# Patient Record
Sex: FEMALE | Race: WHITE | HISPANIC OR LATINO | Employment: FULL TIME | URBAN - METROPOLITAN AREA
[De-identification: names, ages, dates, MRNs, and addresses within clinical notes are randomized per-mention and may not be internally consistent; named-entity substitution may affect disease eponyms.]

---

## 2018-09-28 ENCOUNTER — HOSPITAL ENCOUNTER (OUTPATIENT)
Dept: LAB | Facility: MEDICAL CENTER | Age: 58
End: 2018-09-28
Attending: FAMILY MEDICINE
Payer: COMMERCIAL

## 2018-09-28 ENCOUNTER — OFFICE VISIT (OUTPATIENT)
Dept: MEDICAL GROUP | Facility: PHYSICIAN GROUP | Age: 58
End: 2018-09-28
Payer: COMMERCIAL

## 2018-09-28 VITALS
DIASTOLIC BLOOD PRESSURE: 68 MMHG | SYSTOLIC BLOOD PRESSURE: 114 MMHG | TEMPERATURE: 97.8 F | RESPIRATION RATE: 14 BRPM | OXYGEN SATURATION: 96 % | HEIGHT: 63 IN | HEART RATE: 70 BPM | WEIGHT: 169 LBS | BODY MASS INDEX: 29.95 KG/M2

## 2018-09-28 DIAGNOSIS — F51.01 PRIMARY INSOMNIA: ICD-10-CM

## 2018-09-28 PROBLEM — R42 PAROXYSMAL VERTIGO: Status: ACTIVE | Noted: 2018-09-28

## 2018-09-28 PROBLEM — R42 DIZZINESS: Status: ACTIVE | Noted: 2018-09-28

## 2018-09-28 PROBLEM — R42 PAROXYSMAL VERTIGO: Status: RESOLVED | Noted: 2018-09-28 | Resolved: 2018-09-28

## 2018-09-28 LAB
ALBUMIN SERPL BCP-MCNC: 3.9 G/DL (ref 3.2–4.9)
ALBUMIN/GLOB SERPL: 1.1 G/DL
ALP SERPL-CCNC: 106 U/L (ref 30–99)
ALT SERPL-CCNC: 34 U/L (ref 2–50)
ANION GAP SERPL CALC-SCNC: 9 MMOL/L (ref 0–11.9)
AST SERPL-CCNC: 23 U/L (ref 12–45)
BASOPHILS # BLD AUTO: 1 % (ref 0–1.8)
BASOPHILS # BLD: 0.06 K/UL (ref 0–0.12)
BILIRUB SERPL-MCNC: 0.6 MG/DL (ref 0.1–1.5)
BUN SERPL-MCNC: 12 MG/DL (ref 8–22)
CALCIUM SERPL-MCNC: 9.5 MG/DL (ref 8.5–10.5)
CHLORIDE SERPL-SCNC: 104 MMOL/L (ref 96–112)
CHOLEST SERPL-MCNC: 217 MG/DL (ref 100–199)
CO2 SERPL-SCNC: 27 MMOL/L (ref 20–33)
CREAT SERPL-MCNC: 0.71 MG/DL (ref 0.5–1.4)
EOSINOPHIL # BLD AUTO: 0.12 K/UL (ref 0–0.51)
EOSINOPHIL NFR BLD: 2.1 % (ref 0–6.9)
ERYTHROCYTE [DISTWIDTH] IN BLOOD BY AUTOMATED COUNT: 41.8 FL (ref 35.9–50)
FASTING STATUS PATIENT QL REPORTED: NORMAL
GLOBULIN SER CALC-MCNC: 3.6 G/DL (ref 1.9–3.5)
GLUCOSE SERPL-MCNC: 105 MG/DL (ref 65–99)
HCT VFR BLD AUTO: 42.3 % (ref 37–47)
HDLC SERPL-MCNC: 67 MG/DL
HGB BLD-MCNC: 14.5 G/DL (ref 12–16)
IMM GRANULOCYTES # BLD AUTO: 0.01 K/UL (ref 0–0.11)
IMM GRANULOCYTES NFR BLD AUTO: 0.2 % (ref 0–0.9)
LDLC SERPL CALC-MCNC: 127 MG/DL
LYMPHOCYTES # BLD AUTO: 2.12 K/UL (ref 1–4.8)
LYMPHOCYTES NFR BLD: 36.4 % (ref 22–41)
MCH RBC QN AUTO: 31.2 PG (ref 27–33)
MCHC RBC AUTO-ENTMCNC: 34.3 G/DL (ref 33.6–35)
MCV RBC AUTO: 91 FL (ref 81.4–97.8)
MONOCYTES # BLD AUTO: 0.47 K/UL (ref 0–0.85)
MONOCYTES NFR BLD AUTO: 8.1 % (ref 0–13.4)
NEUTROPHILS # BLD AUTO: 3.04 K/UL (ref 2–7.15)
NEUTROPHILS NFR BLD: 52.2 % (ref 44–72)
NRBC # BLD AUTO: 0 K/UL
NRBC BLD-RTO: 0 /100 WBC
PLATELET # BLD AUTO: 222 K/UL (ref 164–446)
PMV BLD AUTO: 12.9 FL (ref 9–12.9)
POTASSIUM SERPL-SCNC: 3.9 MMOL/L (ref 3.6–5.5)
PROT SERPL-MCNC: 7.5 G/DL (ref 6–8.2)
RBC # BLD AUTO: 4.65 M/UL (ref 4.2–5.4)
SODIUM SERPL-SCNC: 140 MMOL/L (ref 135–145)
TRIGL SERPL-MCNC: 113 MG/DL (ref 0–149)
WBC # BLD AUTO: 5.8 K/UL (ref 4.8–10.8)

## 2018-09-28 PROCEDURE — 99204 OFFICE O/P NEW MOD 45 MIN: CPT | Performed by: FAMILY MEDICINE

## 2018-09-28 PROCEDURE — 80061 LIPID PANEL: CPT

## 2018-09-28 PROCEDURE — 85025 COMPLETE CBC W/AUTO DIFF WBC: CPT

## 2018-09-28 PROCEDURE — 36415 COLL VENOUS BLD VENIPUNCTURE: CPT

## 2018-09-28 PROCEDURE — 80053 COMPREHEN METABOLIC PANEL: CPT

## 2018-09-28 RX ORDER — TRAZODONE HYDROCHLORIDE 50 MG/1
50 TABLET ORAL NIGHTLY PRN
Qty: 30 TAB | Refills: 3 | Status: SHIPPED | OUTPATIENT
Start: 2018-09-28 | End: 2018-10-03 | Stop reason: SDUPTHER

## 2018-09-28 NOTE — ASSESSMENT & PLAN NOTE
Reports getting dizzy when she lays down on her bed, worse when she turn her head from side to side. Does not occur any other time. Denies any palpitations when this happens.  
Trouble sleeping x months, worried about family in [facility]. Can't get to sleep before 1 AM, wakes up tired at 5 AM to go to work. Has not tried any otc remedies.  
Unknown if ever smoked

## 2018-09-28 NOTE — LETTER
[facility]  Garrett Guo M.D.  [address]  [place] [address]  Fax: [phone]   Authorization for Release/Disclosure of   Protected Health Information   Name: MEKA MADDEN : 1960 SSN: [id number]   Address: [address] [place] [address] [place] [address] Phone:    [phone] (home)    I authorize the entity listed below to release/disclose the PHI below to:   [facility]/Garrett Guo M.D. and Garrett Guo M.D.   Provider or Entity Name:     Address   City, State, [facility]   Phone:      Fax:     Reason for request: continuity of care   Information to be released:    [  ] LAST COLONOSCOPY,  including any PATH REPORT and follow-up  [  ] LAST FIT/COLOGUARD RESULT [  ] LAST DEXA  [  ] LAST MAMMOGRAM  [  ] LAST PAP  [  ] LAST LABS [  ] RETINA EXAM REPORT  [  ] IMMUNIZATION RECORDS  [  ] Release all info      [  ] Check here and initial the line next to each item to release ALL health information INCLUDING  _____ Care and treatment for drug and / or alcohol abuse  _____ HIV testing, infection status, or AIDS  _____ Genetic Testing    DATES OF SERVICE OR TIME PERIOD TO BE DISCLOSED: _____________  I understand and acknowledge that:  * This Authorization may be revoked at any time by you in writing, except if your health information has already been used or disclosed.  * Your health information that will be used or disclosed as a result of you signing this authorization could be re-disclosed by the recipient. If this occurs, your re-disclosed health information may no longer be protected by State or Federal laws.  * You may refuse to sign this Authorization. Your refusal will not affect your ability to obtain treatment.  * This Authorization becomes effective upon signing and will  on (date) __________.      If no date is indicated, this Authorization will  one (1) year from the signature date.    Name: Meka Madden    Signature:   Date:     2018       PLEASE FAX  REQUESTED RECORDS BACK TO: [phone]

## 2018-09-28 NOTE — PROGRESS NOTES
Complaint: Not sleeping, dizzy spells     Subjective:     Meka Madden is a 58 y.o. female here today accompanied by her , who serves as . Used to see Dr. Marquez.    Primary insomnia  Trouble sleeping x months, worried about family in [facility]. Can't get to sleep before 1 AM, wakes up tired at 5 AM to go to work. Has not tried any otc remedies. Denies any anxiety or depression.    Dizziness  Reports getting dizzy when she lays down on her bed, worse when she turn her head from side to side. Does not occur any other time. Denies any palpitations when this happens. No ringing in ears.     Wants baseline labs.    Current medicines (including changes today)  Current Outpatient Prescriptions   Medication Sig Dispense Refill   • traZODone (DESYREL) 50 MG Tab Take 1 Tab by mouth at bedtime as needed for Sleep. 30 Tab 3     No current facility-administered medications for this visit.      She  has no past medical history on file.    Health Maintenance: utd on [email or web address], last mammo this past June wnl.      Allergies: Patient has no known allergies.    Current Outpatient Prescriptions Ordered in [facility]   Medication Sig Dispense Refill   • traZODone (DESYREL) 50 MG Tab Take 1 Tab by mouth at bedtime as needed for Sleep. 30 Tab 3     No current Epic-ordered facility-administered medications on file.        History reviewed. No pertinent past medical history.    Past Surgical History:   Procedure Laterality Date   • ABDOMINAL HYSTERECTOMY TOTAL         Social History   Substance Use Topics   • Smoking status: Never Smoker   • Smokeless tobacco: Never Used   • Alcohol use No       Social History     Social History Narrative   • No narrative on file       Family History   Problem Relation Age of Onset   • Lung Disease Mother    • Cancer Father         lung   • Diabetes Sister    • Psychiatry Brother         schizophrenia         ROS Positive for trouble getting to sleep, getting dizzy when tuns head from  "side to side.  Patient denies any fever, chills, unintentional weight gain/loss, fatigue, stroke symptoms,  headache, nasal congestion, sore-throat, cough, heartburn, chest pain, difficulty breathing, abdominal discomfort, diarrhea/constipation, burning with urination or frequency, joint or back pain, skin rashes, depression or anxiety.       Objective:     Blood pressure 114/68, pulse 70, temperature 36.6 °C (97.8 °F), resp. rate 14, height 1.6 m (5' 3\"), weight 76.7 kg (169 lb), SpO2 96 %. Body mass index is 29.94 kg/m².   Physical Exam:  Constitutional: Alert, no distress.  Skin: Warm, dry, good turgor, no rashes in visible areas.  Eye: Equal, round and reactive, conjunctiva clear, lids normal. No positional nystagmus.  ENMT: Lips without lesions, good dentition, oropharynx clear. TMs pearly.  Neck: Trachea midline, no masses, no thyromegaly. No cervical or supraclavicular lymphadenopathy  Respiratory: Unlabored respiratory effort, lungs clear to auscultation, no wheezes, no ronchi.  Cardiovascular: Normal S1, S2, no murmur, no extremity edema. No carotid bruits.    Psych: Alert and oriented x3, appropriate affect and mood.        Assessment and Plan:   The following treatment plan was discussed    1. Positional vertigo  Chronic recurrent problem. Explained nature of ailment to patient. Told to try to hold the position until dizziness wears off, then turn head to other side and do the same. May come in as [place]  if sxs during daytime. Will notify with test results.  - CBC WITH DIFFERENTIAL; Future  - COMP METABOLIC PANEL; Future  - LIPID PROFILE; Future    2. Primary insomnia  Chronic problem. Will try   - traZODone (DESYREL) 50 MG Tab; Take 1 Tab by mouth at bedtime as needed for Sleep.  Dispense: 30 Tab; Refill: 3      Followup: Return in about 3 months (around 12/28/2018).    Please note that this dictation was created using voice recognition software. I have made every reasonable attempt to correct obvious " errors, but I expect that there are errors of grammar and possibly content that I did not discover before finalizing the note.

## 2018-10-03 DIAGNOSIS — F51.01 PRIMARY INSOMNIA: ICD-10-CM

## 2018-10-03 RX ORDER — TRAZODONE HYDROCHLORIDE 50 MG/1
50 TABLET ORAL NIGHTLY PRN
Qty: 30 TAB | Refills: 3 | Status: SHIPPED | OUTPATIENT
Start: 2018-10-03 | End: 2019-03-05

## 2018-10-05 ENCOUNTER — OFFICE VISIT (OUTPATIENT)
Dept: MEDICAL GROUP | Facility: PHYSICIAN GROUP | Age: 58
End: 2018-10-05
Payer: COMMERCIAL

## 2018-10-05 VITALS
SYSTOLIC BLOOD PRESSURE: 118 MMHG | TEMPERATURE: 98.2 F | RESPIRATION RATE: 14 BRPM | DIASTOLIC BLOOD PRESSURE: 64 MMHG | HEART RATE: 74 BPM | HEIGHT: 63 IN | OXYGEN SATURATION: 97 % | BODY MASS INDEX: 29.95 KG/M2 | WEIGHT: 169 LBS

## 2018-10-05 DIAGNOSIS — E78.5 ELEVATED LIPIDS: ICD-10-CM

## 2018-10-05 DIAGNOSIS — F51.01 PRIMARY INSOMNIA: ICD-10-CM

## 2018-10-05 DIAGNOSIS — R73.9 ELEVATED BLOOD SUGAR: ICD-10-CM

## 2018-10-05 PROBLEM — R42 DIZZINESS: Status: RESOLVED | Noted: 2018-09-28 | Resolved: 2018-10-05

## 2018-10-05 LAB
HBA1C MFR BLD: 5.5 % (ref ?–5.8)
INT CON NEG: NORMAL
INT CON POS: NORMAL

## 2018-10-05 PROCEDURE — 83036 HEMOGLOBIN GLYCOSYLATED A1C: CPT | Performed by: FAMILY MEDICINE

## 2018-10-05 PROCEDURE — 99213 OFFICE O/P EST LOW 20 MIN: CPT | Performed by: FAMILY MEDICINE

## 2018-10-05 NOTE — PROGRESS NOTES
Complaint: f/u sleep, labs     Subjective:     Meka Madden is a 58 y.o. female here today for f/u. Accompanied by her .    Primary insomnia  Sleeping well on trazodone.    Elevated lipids  Recent FLP with Tchol 217, , HDL 67.    Elevated blood sugar  Recent .     Rest of labs wnl.     Taking calcium and vit d daily.    Current medicines (including changes today)  Current Outpatient Prescriptions   Medication Sig Dispense Refill   • traZODone (DESYREL) 50 MG Tab Take 1 Tab by mouth at bedtime as needed for Sleep. 30 Tab 3     No current facility-administered medications for this visit.      She  has no past medical history on file.    Health Maintenance: [facility].      Allergies: Patient has no known allergies.    Current Outpatient Prescriptions Ordered in [employer]   Medication Sig Dispense Refill   • traZODone (DESYREL) 50 MG Tab Take 1 Tab by mouth at bedtime as needed for Sleep. 30 Tab 3     No current Epic-ordered facility-administered medications on file.        History reviewed. No pertinent past medical history.    Past Surgical History:   Procedure Laterality Date   • ABDOMINAL HYSTERECTOMY TOTAL         Social History   Substance Use Topics   • Smoking status: Never Smoker   • Smokeless tobacco: Never Used   • Alcohol use No       Social History     Social History Narrative   • No narrative on file       Family History   Problem Relation Age of Onset   • Lung Disease Mother    • Cancer Father         lung   • Diabetes Sister    • Psychiatry Brother         schizophrenia         ROS   Patient denies any fever, chills, unintentional weight gain/loss, fatigue, stroke symptoms, dizziness, headache, nasal congestion, sore-throat, cough, heartburn, chest pain, difficulty breathing, abdominal discomfort, diarrhea/constipation, burning with urination or frequency, joint or back pain, skin rashes, depression or anxiety.       Objective:     Blood pressure 118/64, pulse 74, temperature  "36.8 °C (98.2 °F), resp. rate 14, height 1.6 m (5' 3\"), weight 76.7 kg (169 lb), SpO2 97 %. Body mass index is 29.94 kg/m².   Physical Exam:  Constitutional: Alert, no distress.  No formal examPsych: Alert and oriented x3, appropriate affect and mood.        Assessment and Plan:   The following treatment plan was discussed    1. Elevated lipids  Due to 10 yr CV risk of only 0.8%. Recommed only Omega 3 and walking daily.    2. Primary insomnia  Controlled on meds.    3. Elevated blood sugar  A1c 5.5% today. No DM.        Followup: Return in about 1 year (around 10/5/2019).    Please note that this dictation was created using voice recognition software. I have made every reasonable attempt to correct obvious errors, but I expect that there are errors of grammar and possibly content that I did not discover before finalizing the note.           "

## 2018-10-11 RX ORDER — ALBUTEROL SULFATE 90 UG/1
2 AEROSOL, METERED RESPIRATORY (INHALATION) 4 TIMES DAILY
Qty: 8.5 G | Refills: 5 | Status: SHIPPED | OUTPATIENT
Start: 2018-10-11 | End: 2019-09-26 | Stop reason: SDUPTHER

## 2018-10-11 RX ORDER — ALBUTEROL SULFATE 90 UG/1
2 AEROSOL, METERED RESPIRATORY (INHALATION) 4 TIMES DAILY
COMMUNITY
End: 2018-10-11 | Stop reason: SDUPTHER

## 2018-10-11 NOTE — TELEPHONE ENCOUNTER
Was the patient seen in the last year in this department? Yes    Does patient have an active prescription for medications requested? Yes    Received Request Via: Pharmacy     Last visit 10/5/2018  Last labs 9/28/2018

## 2019-03-05 ENCOUNTER — OFFICE VISIT (OUTPATIENT)
Dept: MEDICAL GROUP | Facility: PHYSICIAN GROUP | Age: 59
End: 2019-03-05
Payer: COMMERCIAL

## 2019-03-05 VITALS
BODY MASS INDEX: 30.83 KG/M2 | HEART RATE: 67 BPM | WEIGHT: 174 LBS | OXYGEN SATURATION: 94 % | SYSTOLIC BLOOD PRESSURE: 122 MMHG | DIASTOLIC BLOOD PRESSURE: 74 MMHG | TEMPERATURE: 98 F | RESPIRATION RATE: 14 BRPM | HEIGHT: 63 IN

## 2019-03-05 DIAGNOSIS — F51.01 PRIMARY INSOMNIA: ICD-10-CM

## 2019-03-05 DIAGNOSIS — J45.20 MILD INTERMITTENT ASTHMA, UNSPECIFIED WHETHER COMPLICATED: ICD-10-CM

## 2019-03-05 PROBLEM — R73.9 ELEVATED BLOOD SUGAR: Status: RESOLVED | Noted: 2018-10-05 | Resolved: 2019-03-05

## 2019-03-05 PROCEDURE — 99213 OFFICE O/P EST LOW 20 MIN: CPT | Performed by: FAMILY MEDICINE

## 2019-03-05 RX ORDER — ZOLPIDEM TARTRATE 5 MG/1
5 TABLET ORAL NIGHTLY PRN
Qty: 15 TAB | Refills: 0 | Status: SHIPPED | OUTPATIENT
Start: 2019-03-05 | End: 2019-03-20

## 2019-03-05 ASSESSMENT — PATIENT HEALTH QUESTIONNAIRE - PHQ9: CLINICAL INTERPRETATION OF PHQ2 SCORE: 0

## 2019-03-05 NOTE — ASSESSMENT & PLAN NOTE
Still not sleeping well. Trazodone doesn't help her ease off to sleep, then when she finally gets to sleep, wakes up in early hours.

## 2019-03-05 NOTE — PROGRESS NOTES
Complaint: f/u sleep     Subjective:     Meka Madden is a 58 y.o. female here today for f/u.    Primary insomnia  Still not sleeping well. Trazodone doesn't help her ease off to sleep, then when she finally gets to sleep, wakes up in early hours.    Mild intermittent asthma  Rarely needs her albuterol inhaler.     Concerned about her 's inability to maintain an erection.    Current medicines (including changes today)  Current Outpatient Prescriptions   Medication Sig Dispense Refill   • zolpidem (AMBIEN) 5 MG Tab Take 1 Tab by mouth at bedtime as needed for Sleep for up to 15 days. 15 Tab 0   • albuterol (PROAIR HFA) 108 (90 Base) MCG/ACT Aero Soln inhalation aerosol Inhale 2 Puffs by mouth 4 times a day. 8.5 g 5     No current facility-administered medications for this visit.      She  has no past medical history on file.    Health Maintenance: needs Papa and mammogram, colonoscopy      Allergies: Patient has no known allergies.    Current Outpatient Prescriptions Ordered in [facility]   Medication Sig Dispense Refill   • zolpidem (AMBIEN) 5 MG Tab Take 1 Tab by mouth at bedtime as needed for Sleep for up to 15 days. 15 Tab 0   • albuterol (PROAIR HFA) 108 (90 Base) MCG/ACT Aero Soln inhalation aerosol Inhale 2 Puffs by mouth 4 times a day. 8.5 g 5     No current [facility]-ordered facility-administered medications on file.        History reviewed. No pertinent past medical history.    Past Surgical History:   Procedure Laterality Date   • ABDOMINAL HYSTERECTOMY TOTAL         Social History   Substance Use Topics   • Smoking status: Never Smoker   • Smokeless tobacco: Never Used   • Alcohol use No       Social History     Social History Narrative   • No narrative on file       Family History   Problem Relation Age of Onset   • Lung Disease Mother    • Cancer Father         lung   • Diabetes Sister    • Psychiatry Brother         schizophrenia         ROS Positive for trouble getting to and staying asleep.  Patient  "denies any fever, chills, unintentional weight gain/loss, fatigue, stroke symptoms, dizziness, headache, nasal congestion, sore-throat, cough, heartburn, chest pain, difficulty breathing, abdominal discomfort, diarrhea/constipation, burning with urination or frequency, joint or back pain, skin rashes, depression or anxiety.       Objective:     Blood pressure 122/74, pulse 67, temperature 36.7 °C (98 °F), resp. rate 14, height 1.6 m (5' 3\"), weight 78.9 kg (174 lb), SpO2 94 %. Body mass index is 30.82 kg/m².   Physical Exam:  Constitutional: Alert, no distress.  No formal exam        Assessment and Plan:   The following treatment plan was discussed    1. Mild intermittent asthma, unspecified whether complicated  Stable. Use MDI only as needed.    2. Primary insomnia  Discussed options.  - zolpidem (AMBIEN) 5 MG Tab; Take 1 Tab by mouth at bedtime as needed for Sleep for up to 15 days.  Dispense: 15 Tab; Refill: 0  - Controlled Substance Treatment Agreement    Recommended she start walking 30-45 min twice a day. Will help her lose weight, improve her sleeping.    Followup: Return in about 2 weeks (around 3/19/2019).    Please note that this dictation was created using voice recognition software. I have made every reasonable attempt to correct obvious errors, but I expect that there are errors of grammar and possibly content that I did not discover before finalizing the note.           "

## 2019-07-08 ENCOUNTER — TELEPHONE (OUTPATIENT)
Dept: MEDICAL GROUP | Facility: PHYSICIAN GROUP | Age: 59
End: 2019-07-08

## 2019-07-08 NOTE — TELEPHONE ENCOUNTER
----- Message from Garrett Guo M.D. sent at 7/5/2019  2:11 PM PDT -----  Please call patient with result. Nl mammogram, repeat yearly.     Thanks.

## 2019-08-13 ENCOUNTER — OFFICE VISIT (OUTPATIENT)
Dept: MEDICAL GROUP | Facility: PHYSICIAN GROUP | Age: 59
End: 2019-08-13
Payer: COMMERCIAL

## 2019-08-13 VITALS
HEART RATE: 76 BPM | DIASTOLIC BLOOD PRESSURE: 60 MMHG | TEMPERATURE: 97.2 F | OXYGEN SATURATION: 97 % | BODY MASS INDEX: 29.92 KG/M2 | SYSTOLIC BLOOD PRESSURE: 100 MMHG | WEIGHT: 175.27 LBS | HEIGHT: 64 IN

## 2019-08-13 DIAGNOSIS — H81.12 BENIGN PAROXYSMAL POSITIONAL VERTIGO OF LEFT EAR: ICD-10-CM

## 2019-08-13 PROCEDURE — 99213 OFFICE O/P EST LOW 20 MIN: CPT | Performed by: FAMILY MEDICINE

## 2019-08-13 NOTE — ASSESSMENT & PLAN NOTE
For past couple weeks has been experiencing some dizziness when sitting up from supine and when turning head to left. No nasal congestion, ringing in ears, blurred vision. Symptoms last a few secs only.

## 2019-08-13 NOTE — PROGRESS NOTES
Complaint: Dizziness     Subjective:     Meka Madden is a 58 y.o. female here today accompanied by her , who serves as .    Dizziness  For past couple weeks has been experiencing some dizziness when sitting up from supine and when turning head to left. No nasal congestion, ringing in ears, blurred vision. Symptoms last a few secs only.     No other concerns or complaints today.    Current medicines (including changes today)  Current Outpatient Medications   Medication Sig Dispense Refill   • albuterol (PROAIR HFA) 108 (90 Base) MCG/ACT Aero Soln inhalation aerosol Inhale 2 Puffs by mouth 4 times a day. (Patient not taking: Reported on 8/13/2019) 8.5 g 5     No current facility-administered medications for this visit.      She  has no past medical history on file.    Health Maintenance:       Allergies: Patient has no known allergies.    Current Outpatient Medications Ordered in Epic   Medication Sig Dispense Refill   • albuterol (PROAIR HFA) 108 (90 Base) MCG/ACT Aero Soln inhalation aerosol Inhale 2 Puffs by mouth 4 times a day. (Patient not taking: Reported on 8/13/2019) 8.5 g 5     No current [facility]-ordered facility-administered medications on file.        History reviewed. No pertinent past medical history.    Past Surgical History:   Procedure Laterality Date   • ABDOMINAL HYSTERECTOMY TOTAL         Social History     Tobacco Use   • Smoking status: Never Smoker   • Smokeless tobacco: Never Used   Substance Use Topics   • Alcohol use: No   • Drug use: No       Social History     Social History Narrative   • Not on file       Family History   Problem Relation Age of Onset   • Lung Disease Mother    • Cancer Father         lung   • Diabetes Sister    • Psychiatric Illness Brother         schizophrenia         ROS Positive for positional dizziness.  Patient denies any fever, chills, unintentional weight gain/loss, fatigue, stroke symptoms, headache, nasal congestion, sore-throat, cough, heartburn,  "chest pain, difficulty breathing, abdominal discomfort, diarrhea/constipation, burning with urination or frequency, joint or back pain, skin rashes, depression or anxiety.       Objective:     /60 (BP Location: Right arm, Patient Position: Sitting)   Pulse 76   Temp 36.2 °C (97.2 °F) (Temporal)   Ht 1.626 m (5' 4\")   Wt 79.5 kg (175 lb 4.3 oz)   SpO2 97%  Body mass index is 30.08 kg/m².   Physical Exam:  Constitutional: Alert, no distress.  Skin: Warm, dry, good turgor, no rashes in visible areas.  Eye: No nystagmus. Equal, round and reactive, conjunctiva clear, lids normal.  ENMT: Lips without lesions, good dentition, oropharynx clear. Congested nares, but patent passages.  Neck: Trachea midline, no masses, no thyromegaly. No cervical or supraclavicular lymphadenopathy  Respiratory: Unlabored respiratory effort, lungs clear to auscultation, no wheezes, no ronchi.  Cardiovascular: Normal S1, S2, no murmur, no extremity edema.  Psych: Alert and oriented x3, appropriate affect and mood.    Orthostats : supine 110/70, 72; sitting 120/76, 76 with dizziness; standing 108/7, 76 without dizziness.    Assessment and Plan:   The following treatment plan was discussed    1. Benign paroxysmal positional vertigo of left ear  Pt and  reassured. Needs to drink more fluids. Sxs should resolve on their own.      Followup: Return if symptoms worsen or fail to improve.    Please note that this dictation was created using voice recognition software. I have made every reasonable attempt to correct obvious errors, but I expect that there are errors of grammar and possibly content that I did not discover before finalizing the note.           "

## 2019-09-26 ENCOUNTER — HOSPITAL ENCOUNTER (OUTPATIENT)
Dept: LAB | Facility: MEDICAL CENTER | Age: 59
End: 2019-09-26
Attending: FAMILY MEDICINE
Payer: COMMERCIAL

## 2019-09-26 ENCOUNTER — OFFICE VISIT (OUTPATIENT)
Dept: MEDICAL GROUP | Facility: PHYSICIAN GROUP | Age: 59
End: 2019-09-26
Payer: COMMERCIAL

## 2019-09-26 VITALS
WEIGHT: 177.25 LBS | OXYGEN SATURATION: 97 % | DIASTOLIC BLOOD PRESSURE: 84 MMHG | RESPIRATION RATE: 20 BRPM | SYSTOLIC BLOOD PRESSURE: 112 MMHG | HEART RATE: 68 BPM | TEMPERATURE: 97 F | BODY MASS INDEX: 30.42 KG/M2

## 2019-09-26 DIAGNOSIS — E78.5 ELEVATED LIPIDS: ICD-10-CM

## 2019-09-26 DIAGNOSIS — F51.01 PRIMARY INSOMNIA: ICD-10-CM

## 2019-09-26 DIAGNOSIS — J45.20 MILD INTERMITTENT ASTHMA, UNSPECIFIED WHETHER COMPLICATED: ICD-10-CM

## 2019-09-26 DIAGNOSIS — Z23 NEED FOR VACCINATION: ICD-10-CM

## 2019-09-26 DIAGNOSIS — Z12.11 SCREENING FOR COLON CANCER: ICD-10-CM

## 2019-09-26 PROBLEM — R42 DIZZINESS: Status: RESOLVED | Noted: 2018-09-28 | Resolved: 2019-09-26

## 2019-09-26 LAB
CHOLEST SERPL-MCNC: 245 MG/DL (ref 100–199)
FASTING STATUS PATIENT QL REPORTED: NORMAL
HDLC SERPL-MCNC: 71 MG/DL
LDLC SERPL CALC-MCNC: 144 MG/DL
TRIGL SERPL-MCNC: 151 MG/DL (ref 0–149)

## 2019-09-26 PROCEDURE — 36415 COLL VENOUS BLD VENIPUNCTURE: CPT

## 2019-09-26 PROCEDURE — 90732 PPSV23 VACC 2 YRS+ SUBQ/IM: CPT | Performed by: FAMILY MEDICINE

## 2019-09-26 PROCEDURE — 99214 OFFICE O/P EST MOD 30 MIN: CPT | Mod: 25 | Performed by: FAMILY MEDICINE

## 2019-09-26 PROCEDURE — 80061 LIPID PANEL: CPT

## 2019-09-26 PROCEDURE — 90471 IMMUNIZATION ADMIN: CPT | Performed by: FAMILY MEDICINE

## 2019-09-26 RX ORDER — TRAZODONE HYDROCHLORIDE 50 MG/1
50 TABLET ORAL NIGHTLY PRN
COMMUNITY
End: 2019-09-26 | Stop reason: SDUPTHER

## 2019-09-26 RX ORDER — TRAZODONE HYDROCHLORIDE 50 MG/1
50 TABLET ORAL NIGHTLY PRN
Qty: 30 TAB | Refills: 2 | Status: SHIPPED | OUTPATIENT
Start: 2019-09-26

## 2019-09-26 NOTE — ASSESSMENT & PLAN NOTE
Maia repeat FLP, last one 9/18 was    Results for ALEX LANE (MRN [id number]) as of 9/26/2019 08:55   Ref. Range 9/28/2018 09:40   Cholesterol,Tot Latest Ref Range: 100 - 199 mg/dL 217 (H)   Triglycerides Latest Ref Range: 0 - 149 mg/dL 113   HDL Latest Ref Range: >=40 mg/dL 67   LDL Latest Ref Range: <100 mg/dL 127 (H)       Not on any meds, low CV risk.

## 2019-09-26 NOTE — PROGRESS NOTES
Complaint: Annual f/u.     Subjective:     Meka Madden is a 59 y.o. female here today for f/u. Doing well.     Mild intermittent asthma  Uses MDI occasionally, needs refill. Nees pneumonia vaccine, declines flu shot.    Primary insomnia  Takes trazodone prn - mainly on weekends, needs refill.    Elevated lipids  Nees repeat FLP, last one 9/18 was    Results for MEKA MADDEN (MRN [id number]) as of 9/26/2019 08:55   Ref. Range 9/28/2018 09:40   Cholesterol,Tot Latest Ref Range: 100 - 199 mg/dL 217 (H)   Triglycerides Latest Ref Range: 0 - 149 mg/dL 113   HDL Latest Ref Range: >=40 mg/dL 67   LDL Latest Ref Range: <100 mg/dL 127 (H)       Not on any meds, low CV risk.     No other concerns or complaints today.    Current medicines (including changes today)  Current Outpatient Medications   Medication Sig Dispense Refill   • traZODone (DESYREL) 50 MG Tab Take 1 Tab by mouth at bedtime as needed. 30 Tab 2   • Albuterol Sulfate (PROAIR RESPICLICK) 108 (90 Base) MCG/ACT AEROSOL POWDER, BREATH ACTIVATED Inhale 2 Puffs by mouth every 6 hours as needed. 1 Each 5     No current facility-administered medications for this visit.      She  has no past medical history on file.    Health Maintenance: declines flu shot.      Allergies: Patient has no known allergies.    Current Outpatient Medications Ordered in Epic   Medication Sig Dispense Refill   • traZODone (DESYREL) 50 MG Tab Take 1 Tab by mouth at bedtime as needed. 30 Tab 2   • Albuterol Sulfate (PROAIR RESPICLICK) 108 (90 Base) MCG/ACT AEROSOL POWDER, BREATH ACTIVATED Inhale 2 Puffs by mouth every 6 hours as needed. 1 Each 5     No current Epic-ordered facility-administered medications on file.        History reviewed. No pertinent past medical history.    Past Surgical History:   Procedure Laterality Date   • ABDOMINAL HYSTERECTOMY TOTAL         Social History     Tobacco Use   • Smoking status: Never Smoker   • Smokeless tobacco: Never Used   Substance Use Topics   •  Alcohol use: No   • Drug use: No       Social History     Social History Narrative   • Not on file       Family History   Problem Relation Age of Onset   • Lung Disease Mother    • Cancer Father         lung   • Diabetes Sister    • Psychiatric Illness Brother         schizophrenia         ROS Positive for pain in thumbs.  Patient denies any fever, chills, unintentional weight gain/loss, fatigue, stroke symptoms, dizziness, headache, nasal congestion, sore-throat, cough, heartburn, chest pain, difficulty breathing, abdominal discomfort, diarrhea/constipation, burning with urination or frequency, back pain, skin rashes, depression or anxiety.       Objective:     /84   Pulse 68   Temp 36.1 °C (97 °F)   Resp 20   Wt 80.4 kg (177 lb 4 oz)   SpO2 97%  Body mass index is 30.42 kg/m².   Physical Exam:  Constitutional: Alert, no distress.  Skin: Warm, dry, good turgor, no rashes in visible areas.  Eye: Equal, round and reactive, conjunctiva clear, lids normal.  ENMT: Lips without lesions, good dentition, oropharynx clear.  Neck: Trachea midline, no masses, no thyromegaly. No cervical or supraclavicular lymphadenopathy  Respiratory: Unlabored respiratory effort, lungs clear to auscultation, no wheezes, no ronchi.  Cardiovascular: Normal S1, S2, no murmur, no extremity edema.  Psych: Alert and oriented x3, appropriate affect and mood.        Assessment and Plan:   The following treatment plan was discussed    1. Need for vaccination  - Pneumovax Vaccine (PPSV23)  Will return for Tdap.    2. Elevated lipids  Will notify with results. If LDL not > 150, will not treat.  - Lipid Profile; Future    3. Mild intermittent asthma, unspecified whether complicated  - Albuterol Sulfate (PROAIR RESPICLICK) 108 (90 Base) MCG/ACT AEROSOL POWDER, BREATH ACTIVATED; Inhale 2 Puffs by mouth every 6 hours as needed.  Dispense: 1 Each; Refill: 5    4. Screening for colon cancer  Will notify with result.  - OCCULT BLOOD FECES  IMMUNOASSAY (FIT); Future    5. Primary insomnia  Refill trazodone      Followup: Return in about 1 year (around 9/26/2020), or if symptoms worsen or fail to improve.    Please note that this dictation was created using voice recognition software. I have made every reasonable attempt to correct obvious errors, but I expect that there are errors of grammar and possibly content that I did not discover before finalizing the note.